# Patient Record
Sex: MALE | Race: WHITE | Employment: FULL TIME | URBAN - METROPOLITAN AREA
[De-identification: names, ages, dates, MRNs, and addresses within clinical notes are randomized per-mention and may not be internally consistent; named-entity substitution may affect disease eponyms.]

---

## 2017-03-23 ENCOUNTER — OFFICE VISIT (OUTPATIENT)
Dept: ONCOLOGY | Age: 57
End: 2017-03-23

## 2017-03-23 VITALS
WEIGHT: 164 LBS | DIASTOLIC BLOOD PRESSURE: 81 MMHG | SYSTOLIC BLOOD PRESSURE: 147 MMHG | RESPIRATION RATE: 18 BRPM | OXYGEN SATURATION: 100 % | TEMPERATURE: 97.8 F | HEIGHT: 76 IN | BODY MASS INDEX: 19.97 KG/M2 | HEART RATE: 77 BPM

## 2017-03-23 DIAGNOSIS — C76.0 HEAD AND NECK CANCER (HCC): Primary | ICD-10-CM

## 2017-03-23 NOTE — PROGRESS NOTES
Renato Polk is a 59 y.o. male here for follow up had concerns including Follow-up. Head and neck, HIPAA verified by two patient identifiers. C/o mild insomnia and mild anxiety ,  Denies pain and sob, patient states feeling well and gaining weight and back to work. Mr. Wesley Stephens has a reminder for a \"due or due soon\" health maintenance. I have asked that he contact his primary care provider for follow-up on this health maintenance.

## 2017-03-23 NOTE — PROGRESS NOTES
Follow-up Note        Patient: Roger Hunter MRN: [id number]  SSN: [id number]    YOB: 1960  Age: 59 y.o. Sex: male      Diagnosis:      1. 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 Concurrent chemo/rads   single agent Cisplatin, Completed 2 cycles, stopped 1/13 due to renal insufficiency   Radiation completed on 2/5/2016  2. Induction chemotherapy - s/p TPF completed 2 cycles      Subjective:      Roger Hunter is a 60 y.o. male with a history of heavy cigarette smoking and alcohol use who was diagnosed with locally advanced oropharyngeal carcinoma. He received 2 cycles of induction chemotherapy and tolerated it well. He had marked improvement in symptoms. He then received concurrent chemo/ Rads. He achieved a complete response. Mr. Manuel Mejia returns for follow-up and feels well. He has returned to work full time. He has gained weight and is eating and swallowing without difficulty. He has delayed geting scans because of insurance issues.       Review of Systems:    Constitutional: negative   Eyes: negative  Ears, Nose, Mouth, Throat, and Face: negative  Respiratory: negative  Cardiovascular: negative  Gastrointestinal: negative  Integument/Breast: negative  Hematologic/Lymphatic: negative  Musculoskeletal:negative  Neurological: negative      Past Medical History:   Diagnosis Date    Anxiety     Cancer ([address]     Sore throat      Past Surgical History:   Procedure Laterality Date    HX OTHER SURGICAL  8-21-15    Biopsy throat    PLACE PERCUT GASTROSTOMY TUBE  9/30/2015           Family History   Problem Relation Age of Onset    Cancer Mother      breast    Cancer Father      prostate    Other Sister      hysterectomy     Social History   Substance Use Topics    Smoking status: Current Every Day Smoker     Packs/day: 0.50     Years: 30.00    Smokeless tobacco: Never Used    Alcohol use No          No Known Allergies        Objective: Vitals:    03/23/17 1334   BP: 147/81   Pulse: 77   Resp: 18   Temp: 97.8 °F (36.6 °C)   SpO2: 100%   Weight: 164 lb (74.4 kg)   Height: 6' 3.5\" (1.918 m)        Physical Exam:    GENERAL: alert, cooperative  EYE: negative  LYMPHATIC: Cervical, supraclavicular, and axillary nodes normal.   THROAT & NECK: posterior pharyngeal wall, no mass, endentuolus  LUNG: clear to auscultation bilaterally  HEART: regular rate and rhythm  ABDOMEN: soft, non-tender  EXTREMITIES: no edema  SKIN: Normal.  NEUROLOGIC: negative      IMAGING:    IMPRESSION:  PET 4/29/2016  1. There has been marked improvement. There has been resolution of the mass    in the oropharynx extending into the hypopharynx. There is slight low grade    residual activity posterior oropharynx may be treatment related. 2. There has been improvement in the bilateral level 2 adenopathy. Lymph    nodes now are normal in size with low-grade activity of 3.4 which is    nonspecific but most likely reactive. Assessment:      1. Squamous cell carcinoma of the oropharynx (epiglottis):    T4b N2a M0 (Stage IVB)    HPV -ve    ECOG PS 0  Intent of therapy - curative    S/P induction chemotherapy with TPF   Completed 2 cycles  Concurrent chemo/rads - single agent Cisplatin, Completed 2 cycles   Cisplatin discontinued due to worsening renal insufficiency  Completed radiation therapy    In remission    Symptom management form reviewed with patient. 2. Renal insufficiency - improving    Observation      3. Pulmonary emobli    > Asymptomatic - incidental finding on CT  > Xarelto started 3/3/16 - completed 6 months    Continue Aspirin 81 mg daily        Plan:        > Port removal  > Will hold off on repeat scans pending insurance resolution  > Follow-up with Dr. Christensen  > Follow-up appointment in 6 months        Signed by: Yves Shea MD                     March 23, 2017          CC.  Rene Dillon MD

## 2017-03-30 ENCOUNTER — HOSPITAL ENCOUNTER (OUTPATIENT)
Dept: INTERVENTIONAL RADIOLOGY/VASCULAR | Age: 57
Discharge: HOME | End: 2017-03-30
Attending: INTERNAL MEDICINE | Admitting: INTERNAL MEDICINE
Payer: COMMERCIAL

## 2017-03-30 VITALS
RESPIRATION RATE: 14 BRPM | HEART RATE: 81 BPM | OXYGEN SATURATION: 100 % | TEMPERATURE: 97.8 F | DIASTOLIC BLOOD PRESSURE: 60 MMHG | SYSTOLIC BLOOD PRESSURE: 108 MMHG | BODY MASS INDEX: 20.51 KG/M2 | HEIGHT: 75 IN | WEIGHT: 165 LBS

## 2017-03-30 DIAGNOSIS — C10.9 OROPHARYNGEAL CANCER (HCC): ICD-10-CM

## 2017-03-30 PROCEDURE — 77030031139 HC SUT VCRL2 J&J -A

## 2017-03-30 PROCEDURE — 77030010507 HC ADH SKN DERMBND J&J -B

## 2017-03-30 PROCEDURE — 36590 REMOVAL TUNNELED CV CATH: CPT

## 2017-03-30 PROCEDURE — 74011000250 HC RX REV CODE- 250: Performed by: RADIOLOGY

## 2017-03-30 PROCEDURE — 74011250636 HC RX REV CODE- 250/636: Performed by: RADIOLOGY

## 2017-03-30 PROCEDURE — 99152 MOD SED SAME PHYS/QHP 5/>YRS: CPT

## 2017-03-30 RX ORDER — LIDOCAINE HYDROCHLORIDE 20 MG/ML
20 INJECTION, SOLUTION INFILTRATION; PERINEURAL ONCE
Status: COMPLETED | OUTPATIENT
Start: 2017-03-30 | End: 2017-03-30

## 2017-03-30 RX ORDER — LIDOCAINE HYDROCHLORIDE AND EPINEPHRINE 10; 10 MG/ML; UG/ML
20 INJECTION, SOLUTION INFILTRATION; PERINEURAL ONCE
Status: COMPLETED | OUTPATIENT
Start: 2017-03-30 | End: 2017-03-30

## 2017-03-30 RX ORDER — SODIUM CHLORIDE 9 MG/ML
25 INJECTION, SOLUTION INTRAVENOUS CONTINUOUS
Status: DISCONTINUED | OUTPATIENT
Start: 2017-03-30 | End: 2017-03-30

## 2017-03-30 RX ORDER — FENTANYL CITRATE 50 UG/ML
100 INJECTION, SOLUTION INTRAMUSCULAR; INTRAVENOUS
Status: DISCONTINUED | OUTPATIENT
Start: 2017-03-30 | End: 2017-03-30

## 2017-03-30 RX ORDER — HEPARIN SODIUM 200 [USP'U]/100ML
400 INJECTION, SOLUTION INTRAVENOUS ONCE
Status: COMPLETED | OUTPATIENT
Start: 2017-03-30 | End: 2017-03-30

## 2017-03-30 RX ORDER — CEFAZOLIN SODIUM IN 0.9 % NACL 2 G/100 ML
2 PLASTIC BAG, INJECTION (ML) INTRAVENOUS ONCE
Status: DISCONTINUED | OUTPATIENT
Start: 2017-03-30 | End: 2017-03-30

## 2017-03-30 RX ORDER — MIDAZOLAM HYDROCHLORIDE 1 MG/ML
5 INJECTION, SOLUTION INTRAMUSCULAR; INTRAVENOUS
Status: DISCONTINUED | OUTPATIENT
Start: 2017-03-30 | End: 2017-03-30

## 2017-03-30 RX ADMIN — LIDOCAINE HYDROCHLORIDE 400 MG: 20 INJECTION, SOLUTION INFILTRATION; PERINEURAL at 09:01

## 2017-03-30 RX ADMIN — FENTANYL CITRATE 50 MCG: 50 INJECTION, SOLUTION INTRAMUSCULAR; INTRAVENOUS at 08:35

## 2017-03-30 RX ADMIN — FENTANYL CITRATE 50 MCG: 50 INJECTION, SOLUTION INTRAMUSCULAR; INTRAVENOUS at 08:45

## 2017-03-30 RX ADMIN — SODIUM CHLORIDE 25 ML/HR: 900 INJECTION, SOLUTION INTRAVENOUS at 08:02

## 2017-03-30 RX ADMIN — LIDOCAINE HYDROCHLORIDE AND EPINEPHRINE 200 MG: 10; 10 INJECTION, SOLUTION INFILTRATION; PERINEURAL at 09:01

## 2017-03-30 RX ADMIN — MIDAZOLAM HYDROCHLORIDE 1 MG: 1 INJECTION INTRAMUSCULAR; INTRAVENOUS at 08:35

## 2017-03-30 RX ADMIN — MIDAZOLAM HYDROCHLORIDE 2 MG: 1 INJECTION INTRAMUSCULAR; INTRAVENOUS at 08:47

## 2017-03-30 RX ADMIN — HEPARIN SODIUM IN SODIUM CHLORIDE 800 UNITS: 200 INJECTION INTRAVENOUS at 09:01

## 2017-03-30 RX ADMIN — MIDAZOLAM HYDROCHLORIDE 1 MG: 1 INJECTION INTRAMUSCULAR; INTRAVENOUS at 08:39

## 2017-03-30 RX ADMIN — SODIUM BICARBONATE 2 ML: 0.2 INJECTION, SOLUTION INTRAVENOUS at 09:01

## 2017-03-30 NOTE — DISCHARGE INSTRUCTIONS
[address]  Special Procedures/Angiography Department    Radiologist:       Date:        Portacath Removal Discharge Instructions      Watch for signs of infection:  redness, fever, chills, increased pain, and/or drainage from the site. If this occurs, call your physician at once. Return next week for an incision check:    Do not sign in. Go to the podium, and ask them to call our department. Please try to come between 9:00AM and 1:00PM    Keep your dressing clean and dry. Leave the dressing in place for the next  three days    Resume your previous diet and follow medication reconciliation form. Do not lift anything heavier than 5 pounds with the affected arm for the next week. You may take Tylenol, as directed on the label, for pain. Avoid ibuprofen (Advil, Motrin) and aspirin as they may cause you to bleed. Because you received sedation, you are not to drive or sign any legal documents for the next 24 hours.       If you have any questions or concerns, please call [phone] and ask for the nurse on-call

## 2017-03-30 NOTE — PROGRESS NOTES
Discharge instructions have been reviewed with patient and present family. Copy given to patient. Pt verbalized understand of instructions and was given  the opportunity to ask questions and receive answers prior to leaving. Pt discharged with family and assisted out by RN in wheelchair.

## 2017-03-30 NOTE — IP AVS SNAPSHOT
Summary of Care Report The Summary of Care report has been created to help improve care coordination. Users with access to [employer] or [employer] [address] (Web-based application) may access additional patient information including the Discharge Summary. If you are not currently a [address] user and need more information, please call the number listed below in the [place] section and ask to be connected with Medical Records. Facility Information Name Address Phone [address] [address] [phone] Patient Information Patient Name Sex  Julio Cesar Julian ([id number]) Male 1960 Discharge Information Admitting Provider Service Area Unit  
 (none) [facility] Andreia Wilson Elizabeth / [phone] Discharge Provider Discharge Date/Time Discharge Disposition Destination (none) (none) (none) (none) Patient Language Language ENGLISH [13] Hospital Problems as of 3/30/2017  Reviewed: 3/23/2017  4:48 PM by Wilmer Humphreys MD  
 None Non-Hospital Problems as of 3/30/2017  Reviewed: 3/23/2017  4:48 PM by Wilmer Humphreys MD  
  
  
  
 Class Noted - Resolved Last Modified Active Problems Oropharyngeal cancer ([facility] [address]  2015 - Present 2015 by Wilmer Humphreys MD  
  Entered by Wilmer Humphreys MD  
  Head and neck cancer [facility])  2015 - Present 10/21/2015 by Amber Petersen NP Entered by Amber Petersen NP Elevated serum creatinine  2016 - Present 2016 by Amber Petersen NP Entered by Amber Petersen NP  
  Pulmonary embolism ([facility] [address]  3/25/2016 - Present 3/25/2016 by Licha Chaney LPN Entered by Licha Chaney LPN You are allergic to the following No active allergies Current Discharge Medication List  
  
ASK your doctor about these medications Dose & Instructions Dispensing Information Comments  
 ondansetron 4 mg disintegrating tablet Commonly known as:  ZOFRAN ODT Dose:  4 mg Take 1 Tab by mouth every eight (8) hours as needed for Nausea. Indications: CANCER CHEMOTHERAPY-INDUCED NAUSEA AND VOMITING Quantity:  60 Tab Refills:  2  
   
 oxyCODONE IR 5 mg immediate release tablet Commonly known as:  Shayan Kuo Dose:  5 mg Take 5 mg by mouth every six (6) hours as needed. Refills:  0  
   
 rivaroxaban 20 mg Tab tablet Commonly known as:  Brooke Mcnamara Dose:  20 mg Take 1 Tab by mouth daily. Quantity:  30 Tab Refills:  2  
   
 varenicline 0.5 mg (11)- 1 mg (42) Dspk Commonly known as:  CHANTIX STARTER HUMPHREY Dose:  1 mg Take 1 mg by mouth daily (after breakfast). Per Dose pack instructions Quantity:  1 Package Refills:  0 Follow-up Information None Discharge Instructions [facility] Special Procedures/Angiography Department Radiologist:    
 
Date:     
 
Portacath Removal Discharge Instructions Watch for signs of infection:  redness, fever, chills, increased pain, and/or drainage from the site. If this occurs, call your physician at once. Return next week for an incision check: Do not sign in. Go to the podium, and ask them to call our department. Please try to come between 9:00AM and 1:00PM 
 
Keep your dressing clean and dry. Leave the dressing in place for the next  three days Resume your previous diet and follow medication reconciliation form. Do not lift anything heavier than 5 pounds with the affected arm for the next week. You may take Tylenol, as directed on the label, for pain. Avoid ibuprofen (Advil, Motrin) and aspirin as they may cause you to bleed. Because you received sedation, you are not to drive or sign any legal documents for the next 24 hours. If you have any questions or concerns, please call [phone] and ask for the nurse on-call Chart Review Routing History Recipient Method Report Sent By Flavia Vargas MD  
Fax: [phone] Phone: [phone] Fax Notes/Transcriptions Carol Vargas MD [[id number]] 9/30/2015  9:00 AM 09/30/2015 Ashley Bowman MD  
Phone: [phone] In Basket Notes/Transcriptions Carol Vargas MD [[id number]] 9/30/2015  9:00 AM 09/30/2015

## 2017-03-30 NOTE — PROGRESS NOTES
Pt arrived from home Ambulatory, stated will call mother Riley Storm to  once procedure complete. Per MD Betsey Florez no antibiotic for procedure.

## 2017-03-30 NOTE — H&P
Radiology History and Physical    Patient: Teri Vega 59 y.o. male       Chief Complaint: No chief complaint on file. History of Present Illness: for port removal    History:    Past Medical History:   Diagnosis Date    Anxiety     Cancer ([address]     Sore throat      Family History   Problem Relation Age of Onset    Cancer Mother      breast    Cancer Father      prostate    Other Sister      hysterectomy     Social History     Social History    Marital status: SINGLE     Spouse name: N/A    Number of children: N/A    Years of education: N/A     Occupational History    Not on file. Social History Main Topics    Smoking status: Current Every Day Smoker     Packs/day: 0.50     Years: 30.00    Smokeless tobacco: Never Used    Alcohol use No    Drug use: No    Sexual activity: Not on file     Other Topics Concern    Not on file     Social History Narrative       Allergies: No Known Allergies    Current Medications:  Current Outpatient Prescriptions   Medication Sig    rivaroxaban (XARELTO) 20 mg tab tablet Take 1 Tab by mouth daily.  ondansetron (ZOFRAN ODT) 4 mg disintegrating tablet Take 1 Tab by mouth every eight (8) hours as needed for Nausea. Indications: CANCER CHEMOTHERAPY-INDUCED NAUSEA AND VOMITING    varenicline (CHANTIX STARTER HUMPHREY) Take 1 mg by mouth daily (after breakfast). Per Dose pack instructions    oxyCODONE IR (ROXICODONE) 5 mg immediate release tablet Take 5 mg by mouth every six (6) hours as needed.      Current Facility-Administered Medications   Medication Dose Route Frequency    heparinized saline 2 units/mL infusion 800 Units  400 mL Irrigation ONCE    lidocaine (XYLOCAINE) 20 mg/mL (2 %) injection 400 mg  20 mL SubCUTAneous ONCE    sodium bicarbonate (NEUT) injection 1 mL  1 mL SubCUTAneous RAD ONCE    lidocaine-EPINEPHrine (XYLOCAINE) 1 %-1:100,000 injection 200 mg  20 mL SubCUTAneous ONCE    0.9% sodium chloride infusion  25 mL/hr IntraVENous CONTINUOUS    fentaNYL citrate (PF) injection 100 mcg  100 mcg IntraVENous Multiple    midazolam (VERSED) injection 5 mg  5 mg IntraVENous Rad Multiple    ceFAZolin (ANCEF) 2g in 100 ml 0.9% NS IVPB  2 g IntraVENous ONCE        Physical Exam:  Blood pressure 128/72, pulse 75, temperature 97.8 °F (36.6 °C), resp. rate 14, height 6' 3\" (1.905 m), weight 74.8 kg (165 lb), SpO2 100 %. LUNG: clear to auscultation bilaterally, HEART: regular rate and rhythm      Alerts:    Hospital Problems  Date Reviewed: 3/23/2017    None          Laboratory:    No results for input(s): HGB, HCT, WBC, PLT, INR, BUN, CREA, K, CRCLT, HGBEXT, HCTEXT, PLTEXT in the last 72 hours. No lab exists for component: PTT, PT, INREXT      Plan of Care/Planned Procedure:  Risks, benefits, and alternatives reviewed with patient and he agrees to proceed with the procedure.        Hanny Mcghee MD

## 2017-09-25 ENCOUNTER — OFFICE VISIT (OUTPATIENT)
Dept: ONCOLOGY | Age: 57
End: 2017-09-25

## 2017-09-25 VITALS
HEART RATE: 75 BPM | BODY MASS INDEX: 23.38 KG/M2 | SYSTOLIC BLOOD PRESSURE: 159 MMHG | WEIGHT: 188 LBS | RESPIRATION RATE: 18 BRPM | TEMPERATURE: 97.7 F | HEIGHT: 75 IN | OXYGEN SATURATION: 100 % | DIASTOLIC BLOOD PRESSURE: 89 MMHG

## 2017-09-25 DIAGNOSIS — C10.9 OROPHARYNGEAL CANCER (HCC): Primary | ICD-10-CM

## 2017-09-25 NOTE — PROGRESS NOTES
Follow-up Note        Patient: Lasha Hyatt MRN: [id number]  SSN: [id number]    YOB: 1960  Age: 59 y.o. Sex: male      Diagnosis:      1. 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 Concurrent chemo/rads   single agent Cisplatin, Completed 2 cycles, stopped 1/13 due to renal insufficiency   Radiation completed on 2/5/2016  2. Induction chemotherapy - s/p TPF completed 2 cycles      Subjective:      Lasha Hyatt is a 60 y.o. male with a history of heavy cigarette smoking and alcohol use who was diagnosed with locally advanced oropharyngeal carcinoma. He received 2 cycles of induction chemotherapy and tolerated it well. He had marked improvement in symptoms. He then received concurrent chemo/ Rads. He achieved a complete response. Mr. Kate Shirley returns for follow-up and feels well. He is working full time. He continues to gain weight and is eating and swallowing without difficulty.        Review of Systems:    Constitutional: negative   Eyes: negative  Ears, Nose, Mouth, Throat, and Face: negative  Respiratory: negative  Cardiovascular: negative  Gastrointestinal: negative  Integument/Breast: negative  Hematologic/Lymphatic: negative  Musculoskeletal:negative  Neurological: negative      Past Medical History:   Diagnosis Date    Anxiety     Cancer [facility] [address]     Sore throat      Past Surgical History:   Procedure Laterality Date    HX OTHER SURGICAL  8-21-15    Biopsy throat    PLACE PERCUT GASTROSTOMY TUBE  9/30/2015           Family History   Problem Relation Age of Onset    Cancer Mother      breast    Cancer Father      prostate    Other Sister      hysterectomy     Social History   Substance Use Topics    Smoking status: Current Every Day Smoker     Packs/day: 0.50     Years: 30.00    Smokeless tobacco: Never Used    Alcohol use No          No Known Allergies        Objective:     Vitals:    09/25/17 0840   BP: 159/89   Pulse: 75   Resp: 18 Temp: 97.7 °F (36.5 °C)   SpO2: 100%   Weight: 188 lb (85.3 kg)   Height: 6' 3\" (1.905 m)        Physical Exam:    GENERAL: alert, cooperative  EYE: negative  LYMPHATIC: Cervical, supraclavicular, and axillary nodes normal.   THROAT & NECK: posterior pharyngeal wall, no mass, endentuolus  LUNG: clear to auscultation bilaterally  HEART: regular rate and rhythm  ABDOMEN: soft, non-tender  EXTREMITIES: no edema  SKIN: Normal.  NEUROLOGIC: negative      IMAGING:    IMPRESSION:  PET 4/29/2016  1. There has been marked improvement. There has been resolution of the mass    in the oropharynx extending into the hypopharynx. There is slight low grade    residual activity posterior oropharynx may be treatment related. 2. There has been improvement in the bilateral level 2 adenopathy. Lymph    nodes now are normal in size with low-grade activity of 3.4 which is    nonspecific but most likely reactive. Assessment:      1. Squamous cell carcinoma of the oropharynx (epiglottis):    T4b N2a M0 (Stage IVB)    HPV -ve    ECOG PS 0  Intent of therapy - curative    S/P induction chemotherapy with TPF   Completed 2 cycles  Concurrent chemo/rads - single agent Cisplatin, Completed 2 cycles   Cisplatin discontinued due to worsening renal insufficiency  Completed radiation therapy    In remission  Asymptomatic  Symptom management form reviewed with patient. 2. H/O Pulmonary emobli    > Asymptomatic - incidental finding on CT  > Xarelto started 3/3/16 - completed 6 months  > Currently on Aspirin 81 mg daily        Plan:        > Continue Aspirin 81 mg daily  > Repeat CT scans in 6 months  > Follow-up appointment in 6 months        Signed by: Zenia Mills MD                     September 25, 2017          CAROLE Markham MD

## 2017-09-25 NOTE — PROGRESS NOTES
Pt is a 59 yr old here for a routine follow up for Oropharyngeal cancer, finished chemo  and radiation. Pt has gained weigh and is happy about this. Pt denies pain, eating well, no N/V/D.

## 2018-02-09 ENCOUNTER — HOSPITAL ENCOUNTER (OUTPATIENT)
Dept: CT IMAGING | Age: 58
Discharge: HOME | End: 2018-02-09
Attending: INTERNAL MEDICINE
Payer: COMMERCIAL

## 2018-02-09 ENCOUNTER — APPOINTMENT (OUTPATIENT)
Dept: CT IMAGING | Age: 58
End: 2018-02-09
Attending: INTERNAL MEDICINE
Payer: COMMERCIAL

## 2018-02-09 DIAGNOSIS — C10.9 OROPHARYNGEAL CANCER (HCC): ICD-10-CM

## 2018-02-09 PROCEDURE — 70491 CT SOFT TISSUE NECK W/DYE: CPT

## 2018-02-09 PROCEDURE — 74011636320 HC RX REV CODE- 636/320: Performed by: INTERNAL MEDICINE

## 2018-02-09 PROCEDURE — 71260 CT THORAX DX C+: CPT

## 2018-02-09 RX ORDER — SODIUM CHLORIDE 9 MG/ML
50 INJECTION, SOLUTION INTRAVENOUS
Status: DISPENSED | OUTPATIENT
Start: 2018-02-09 | End: 2018-02-09

## 2018-02-09 RX ORDER — SODIUM CHLORIDE 0.9 % (FLUSH) 0.9 %
10 SYRINGE (ML) INJECTION
Status: ACTIVE | OUTPATIENT
Start: 2018-02-09 | End: 2018-02-09

## 2018-02-09 RX ADMIN — IOPAMIDOL 100 ML: 755 INJECTION, SOLUTION INTRAVENOUS at 09:00

## 2018-02-16 ENCOUNTER — OFFICE VISIT (OUTPATIENT)
Dept: ONCOLOGY | Age: 58
End: 2018-02-16

## 2018-02-16 VITALS
HEIGHT: 75 IN | WEIGHT: 199.4 LBS | DIASTOLIC BLOOD PRESSURE: 83 MMHG | OXYGEN SATURATION: 98 % | BODY MASS INDEX: 24.79 KG/M2 | SYSTOLIC BLOOD PRESSURE: 149 MMHG | RESPIRATION RATE: 16 BRPM | HEART RATE: 79 BPM | TEMPERATURE: 98.3 F

## 2018-02-16 DIAGNOSIS — I26.99 OTHER ACUTE PULMONARY EMBOLISM WITHOUT ACUTE COR PULMONALE (HCC): ICD-10-CM

## 2018-02-16 DIAGNOSIS — C76.0 HEAD AND NECK CANCER (HCC): Primary | ICD-10-CM

## 2018-02-16 DIAGNOSIS — C10.9 OROPHARYNGEAL CANCER (HCC): ICD-10-CM

## 2018-02-16 NOTE — PROGRESS NOTES
Follow-up Note        Patient: Nisas Benavides MRN: [id number]  SSN: [id number]    YOB: 1960  Age: 59 y.o. Sex: male      Diagnosis:      1. 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 Concurrent chemo/rads   single agent Cisplatin, Completed 2 cycles, stopped 1/13 due to renal insufficiency   Radiation completed on 2/5/2016  2. Induction chemotherapy - s/p TPF completed 2 cycles      Subjective:      Nissa Benavides is a 60 y.o. male with a history of heavy cigarette smoking and alcohol use who was diagnosed with locally advanced oropharyngeal carcinoma. He received 2 cycles of induction chemotherapy and tolerated it well. He had marked improvement in symptoms. He then received concurrent chemo/ Rads. He achieved a complete response. Mr. Joel Wilkerson returns for follow-up and feels well. He is working full time. He is doing well and does not verbalize any new complaints. Review of Systems:    Constitutional: negative   Eyes: negative  Ears, Nose, Mouth, Throat, and Face: negative  Respiratory: negative  Cardiovascular: negative  Gastrointestinal: negative  Integument/Breast: negative  Hematologic/Lymphatic: negative  Musculoskeletal:negative  Neurological: negative      Past Medical History:   Diagnosis Date    Anxiety     Cancer ([facility] [address]     Sore throat      Past Surgical History:   Procedure Laterality Date    HX OTHER SURGICAL  8-21-15    Biopsy throat    PLACE PERCUT GASTROSTOMY TUBE  9/30/2015           Family History   Problem Relation Age of Onset    Cancer Mother      breast    Cancer Father      prostate    Other Sister      hysterectomy     Social History   Substance Use Topics    Smoking status: Current Every Day Smoker     Packs/day: 0.50     Years: 30.00    Smokeless tobacco: Never Used    Alcohol use No          No Known Allergies        Objective: There were no vitals filed for this visit.      Physical Exam:    GENERAL: alert, cooperative  EYE: negative  LYMPHATIC: Cervical, supraclavicular, and axillary nodes normal.   THROAT & NECK: posterior pharyngeal wall, no mass, endentuolus  LUNG: clear to auscultation bilaterally  HEART: regular rate and rhythm  ABDOMEN: soft, non-tender  EXTREMITIES: no edema  SKIN: Normal.  NEUROLOGIC: negative      CT Results (most recent):    Results from Hospital Encounter encounter on 02/09/18   CT CHEST W CONT   Narrative INDICATION: Restaging disease  , squamous cell carcinoma of the oropharynx  status post chemotherapy and radiation therapy, therapy completed. COMPARISON: CT of 3/3/2016, PET CT of 4/29/2016    TECHNIQUE:  Following the uneventful intravenous administration of 100 cc  Isovue-370, 5 mm axial images were obtained through the chest. Coronal and  sagittal reconstructions were generated. CT dose reduction was achieved through  use of a standardized protocol tailored for this examination and automatic  exposure control for dose modulation. The study was performed in conjunction  with CT of the neck, which is reported separately. FINDINGS:    MEDIASTINUM: No mass or lymphadenopathy. NIKO: No mass or lymphadenopathy. THORACIC AORTA: No dissection or aneurysm. MAIN PULMONARY ARTERY: Normal in caliber. TRACHEA/BRONCHI: Patent. ESOPHAGUS: No wall thickening or dilatation. HEART: Normal in size. PLEURA: No effusion or pneumothorax. LUNGS: No nodule, mass, or airspace disease. Centrilobular emphysema,  particularly in the upper lobes. INCIDENTALLY IMAGED UPPER ABDOMEN: Unchanged small right adrenal adenoma. Cholelithiasis. Small hiatus hernia. BONES: No destructive bone lesion. Impression IMPRESSION:  1. No acute findings or evidence of metastatic disease in the chest.  2. Mild centrilobular emphysema. 3. Cholelithiasis. 4. Unchanged small right adrenal adenoma. CT personally reviewed. Assessment:      1.  Squamous cell carcinoma of the oropharynx (epiglottis): T4b N2a M0 (Stage IVB)    HPV -ve    ECOG PS 0  Intent of therapy - curative    S/P induction chemotherapy with TPF   Completed 2 cycles  Concurrent chemo/rads - single agent Cisplatin, Completed 2 cycles   Cisplatin discontinued due to worsening renal insufficiency  Completed radiation therapy    In remission  Asymptomatic  Symptom management form reviewed with patient. 2. H/O Pulmonary emobli    > Asymptomatic - incidental finding on CT  > Xarelto started 3/3/16 - completed 6 months  > Currently on Aspirin 81 mg daily        Plan:        > Continue Aspirin 81 mg daily  > Repeat CT scans in 6 months  > Follow-up appointment in 6 months        Signed by: Alessandra Fabian MD                     February 16, 2018          CC.  Dorys Jay MD

## 2018-02-16 NOTE — PROGRESS NOTES
Jf Evangelista is a 59 y.o. male here today for advanced oropharyngeal cancer; in remission f/u. Completed Rads 2/2016. VS stable. Patient denies pain. Patient denies N/V/D and constipation. Patient denies falls. Patient denies numbness and tingling. Good appetite. Blood pressure 149/83, pulse 79, temperature 98.3 °F (36.8 °C), resp. rate 16, height 6' 3\" (1.905 m), weight 199 lb 6.4 oz (90.4 kg), SpO2 98 %.

## 2018-08-15 ENCOUNTER — DOCUMENTATION ONLY (OUTPATIENT)
Dept: ONCOLOGY | Age: 58
End: 2018-08-15

## 2018-08-15 NOTE — PROGRESS NOTES
Pt called to cancel 8/16/18 appt. Pt was laid off from job in May, and currently has no insurance. Made patient aware of Sliding Fee scale, and we would see him at any time. Patient is feeling well, and does not wish to be seen at this point, but will be calling once he finds employment with benefits.

## 2020-11-17 NOTE — PROGRESS NOTES
Yvon Maravilla is a 56 y.o. male here for Oropharyngeal cancer. Has not been seen since Feb, 2018. He feels like the tumor may have come back in throat. He can feel the tumor with his hands. Felt it a few months ago and has been getting bigger. He does have pain in both sides of neck and is intermittent. Level 5/6 when he has it. He uses ibuprofen for it.

## 2020-11-18 ENCOUNTER — DOCUMENTATION ONLY (OUTPATIENT)
Dept: ONCOLOGY | Age: 60
End: 2020-11-18

## 2020-11-18 ENCOUNTER — OFFICE VISIT (OUTPATIENT)
Dept: ONCOLOGY | Age: 60
End: 2020-11-18
Payer: COMMERCIAL

## 2020-11-18 VITALS
TEMPERATURE: 97.8 F | DIASTOLIC BLOOD PRESSURE: 80 MMHG | BODY MASS INDEX: 19.7 KG/M2 | HEART RATE: 92 BPM | HEIGHT: 75 IN | WEIGHT: 158.4 LBS | SYSTOLIC BLOOD PRESSURE: 158 MMHG

## 2020-11-18 DIAGNOSIS — C76.0 HEAD AND NECK CANCER (HCC): Primary | ICD-10-CM

## 2020-11-18 DIAGNOSIS — R13.10 DYSPHAGIA, UNSPECIFIED TYPE: ICD-10-CM

## 2020-11-18 DIAGNOSIS — E43 SEVERE PROTEIN-CALORIE MALNUTRITION (HCC): ICD-10-CM

## 2020-11-18 PROCEDURE — 99215 OFFICE O/P EST HI 40 MIN: CPT | Performed by: INTERNAL MEDICINE

## 2020-11-18 NOTE — PROGRESS NOTES
[address] [facility]  at [address], [address] [facility]  [phone]       Follow-up Note        Patient: Marlena Grace MRN: [id number]  SSN: [id number]    YOB: 1960  Age: 56 y.o. Sex: male      Diagnosis:      1. Squamous cell carcinoma of the oropharynx, Dx 2015   T4b N2a M0 (Stage IVB)    Treatment:      Previous therapies    1. Concurrent chemo/rads   single agent Cisplatin, Completed 2 cycles, stopped 1/13 due to renal insufficiency   Radiation completed on 2/5/2016  2. Induction chemotherapy - s/p TPF completed 2 cycles    Subjective:      Marlena Grace is a 60 y.o. male with a history of heavy cigarette smoking and alcohol use who was diagnosed with locally advanced oropharyngeal carcinoma in 2015. He received 2 cycles of induction chemotherapy and tolerated it well. He had marked improvement in symptoms. He then received concurrent chemo/ Rads. He achieved a complete response. Mr. Mariana Brooke returns for follow-up. He has been experiencing progressive hoarseness of voice, difficulty in swallowing and weight loss. He is working full time. He is weaker.        Review of Systems:    Constitutional: fatigue, weakness  Eyes: negative  Ears, Nose, Mouth, Throat, and Face: dysphagia, hoarseness of voice  Respiratory: negative  Cardiovascular: negative  Gastrointestinal: dysphagia  Integument/Breast: negative  Hematologic/Lymphatic: negative  Musculoskeletal:negative  Neurological: negative      Past Medical History:   Diagnosis Date    Anxiety     Cancer ([address]     Sore throat      Past Surgical History:   Procedure Laterality Date    HX OTHER SURGICAL  8-21-15    Biopsy throat    PLACE PERCUT GASTROSTOMY TUBE  9/30/2015           Family History   Problem Relation Age of Onset    Cancer Mother         breast    Cancer Father         prostate    Other Sister         hysterectomy     Social History Tobacco Use    Smoking status: Current Every Day Smoker     Packs/day: 0.50     Years: 30.00     Pack years: 15.00    Smokeless tobacco: Never Used   Substance Use Topics    Alcohol use: No     Alcohol/week: 0.0 standard drinks          No Known Allergies        Objective:     Vitals:    11/18/20 0923   BP: (!) 158/80   Pulse: 92   Temp: 97.8 °F (36.6 °C)   Weight: 158 lb 6.4 oz (71.8 kg)   Height: 6' 3\" (1.905 m)        Physical Exam:    GENERAL: alert, cooperative, weak, cachectic  EYE: negative, no pallor or icterus  LYMPHATIC: Cervical, supraclavicular, and axillary nodes normal.   THROAT & NECK: hard appearing larynx  LUNG: clear to auscultation bilaterally  HEART: regular rate and rhythm  ABDOMEN: soft, non-tender on palpation  EXTREMITIES: no edema  SKIN: Normal. No rash or ecchymosis  NEUROLOGIC: negative          Assessment:      1. 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 H/O Pulmonary emobli    > Asymptomatic - incidental finding on CT  > Xarelto started 3/3/16 - completed 6 months  > Currently on Aspirin 81 mg daily      3. Severe protein calorie malnutrition    > Dietician consult  > Protein supplementation      Plan:        > CT neck and chest  > ENT eval  > Dietician consult  > Protein supplementation      Signed by: Marty Ceja MD                     November 18, 2020      CCChanel Mcrae MD

## 2020-11-18 NOTE — PROGRESS NOTES
[employer]  Social Work Navigator Encounter     Patient Name:  Mary Muse    Medical History: dx head and neck cancer    Advance Directives:    Narrative: Pt been feeling the discomfort in his throat since February etc.   Covid and denial he says and kept thinking I will make the appt soon. Pt working evening 7-3:30 for [employer]. But not a lot of staff so people have vacation/sick and he has to work longer because there are only 3 of them. Pt will need to see Dr. Lynne.  Pt has lost 28 lbs. Pt drinking ensure to try and maintain weight. Pt will needs scans.        Barriers to Care:     Plan:    Referral:     Other referral

## 2020-11-28 ENCOUNTER — HOSPITAL ENCOUNTER (OUTPATIENT)
Dept: CT IMAGING | Age: 60
Discharge: HOME | End: 2020-11-28
Attending: INTERNAL MEDICINE
Payer: COMMERCIAL

## 2020-11-28 DIAGNOSIS — C76.0 HEAD AND NECK CANCER (HCC): ICD-10-CM

## 2020-11-28 DIAGNOSIS — R13.10 DYSPHAGIA, UNSPECIFIED TYPE: ICD-10-CM

## 2020-11-28 PROCEDURE — 71260 CT THORAX DX C+: CPT

## 2020-11-28 PROCEDURE — 70491 CT SOFT TISSUE NECK W/DYE: CPT

## 2020-11-28 PROCEDURE — 74011000636 HC RX REV CODE- 636: Performed by: INTERNAL MEDICINE

## 2020-11-28 RX ORDER — SODIUM CHLORIDE 0.9 % (FLUSH) 0.9 %
10 SYRINGE (ML) INJECTION
Status: COMPLETED | OUTPATIENT
Start: 2020-11-28 | End: 2020-11-28

## 2020-11-28 RX ADMIN — IOPAMIDOL 100 ML: 755 INJECTION, SOLUTION INTRAVENOUS at 09:13

## 2020-11-28 RX ADMIN — Medication 10 ML: at 09:13

## 2020-11-30 ENCOUNTER — TELEPHONE (OUTPATIENT)
Dept: ONCOLOGY | Age: 60
End: 2020-11-30

## 2020-11-30 NOTE — TELEPHONE ENCOUNTER
----- Message from Deann Thomas MD sent at 11/29/2020  9:49 PM EST -----  NO clear evidence of disease progression/recurrence.

## 2020-11-30 NOTE — TELEPHONE ENCOUNTER
Pt met with  last week and had a scope done and was told he does have a mass noted. Pt asked that  and  please speak to discuss what is needed next.

## 2020-12-08 ENCOUNTER — HOSPITAL ENCOUNTER (OUTPATIENT)
Dept: LAB | Age: 60
Discharge: HOME | End: 2020-12-08

## 2020-12-21 ENCOUNTER — TRANSCRIBE ORDER (OUTPATIENT)
Dept: REGISTRATION | Age: 60
End: 2020-12-21

## 2020-12-21 DIAGNOSIS — Z01.812 PRE-PROCEDURE LAB EXAM: Primary | ICD-10-CM

## 2020-12-22 ENCOUNTER — HOSPITAL ENCOUNTER (OUTPATIENT)
Dept: PREADMISSION TESTING | Age: 60
Discharge: HOME | End: 2020-12-22
Payer: COMMERCIAL

## 2020-12-22 VITALS
DIASTOLIC BLOOD PRESSURE: 82 MMHG | SYSTOLIC BLOOD PRESSURE: 151 MMHG | HEART RATE: 79 BPM | BODY MASS INDEX: 19.41 KG/M2 | HEIGHT: 75 IN | OXYGEN SATURATION: 98 % | WEIGHT: 156.09 LBS | TEMPERATURE: 98.4 F

## 2020-12-22 LAB
BASOPHILS # BLD: 0 K/UL (ref 0–0.1)
BASOPHILS NFR BLD: 0 % (ref 0–1)
DIFFERENTIAL METHOD BLD: ABNORMAL
EOSINOPHIL # BLD: 0.2 K/UL (ref 0–0.4)
EOSINOPHIL NFR BLD: 2 % (ref 0–7)
ERYTHROCYTE [DISTWIDTH] IN BLOOD BY AUTOMATED COUNT: 14.6 % (ref 11.5–14.5)
HCT VFR BLD AUTO: 34.4 % (ref 36.6–50.3)
HGB BLD-MCNC: 11 G/DL (ref 12.1–17)
IMM GRANULOCYTES # BLD AUTO: 0 K/UL (ref 0–0.04)
IMM GRANULOCYTES NFR BLD AUTO: 0 % (ref 0–0.5)
LYMPHOCYTES # BLD: 0.5 K/UL (ref 0.8–3.5)
LYMPHOCYTES NFR BLD: 6 % (ref 12–49)
MCH RBC QN AUTO: 27.6 PG (ref 26–34)
MCHC RBC AUTO-ENTMCNC: 32 G/DL (ref 30–36.5)
MCV RBC AUTO: 86.4 FL (ref 80–99)
MONOCYTES # BLD: 0.7 K/UL (ref 0–1)
MONOCYTES NFR BLD: 9 % (ref 5–13)
NEUTS SEG # BLD: 6.1 K/UL (ref 1.8–8)
NEUTS SEG NFR BLD: 83 % (ref 32–75)
NRBC # BLD: 0 K/UL (ref 0–0.01)
NRBC BLD-RTO: 0 PER 100 WBC
PLATELET # BLD AUTO: 239 K/UL (ref 150–400)
PMV BLD AUTO: 10.7 FL (ref 8.9–12.9)
RBC # BLD AUTO: 3.98 M/UL (ref 4.1–5.7)
RBC MORPH BLD: ABNORMAL
WBC # BLD AUTO: 7.5 K/UL (ref 4.1–11.1)

## 2020-12-22 PROCEDURE — 85025 COMPLETE CBC W/AUTO DIFF WBC: CPT

## 2020-12-22 PROCEDURE — 36415 COLL VENOUS BLD VENIPUNCTURE: CPT

## 2020-12-24 ENCOUNTER — HOSPITAL ENCOUNTER (OUTPATIENT)
Dept: PREADMISSION TESTING | Age: 60
Discharge: HOME | End: 2020-12-24
Payer: COMMERCIAL

## 2020-12-24 DIAGNOSIS — Z01.812 PRE-PROCEDURE LAB EXAM: ICD-10-CM

## 2020-12-24 PROCEDURE — 87635 SARS-COV-2 COVID-19 AMP PRB: CPT

## 2020-12-25 ENCOUNTER — ANESTHESIA EVENT (OUTPATIENT)
Dept: SURGERY | Age: 60
End: 2020-12-25
Payer: COMMERCIAL

## 2020-12-25 LAB — SARS-COV-2, COV2NT: NOT DETECTED

## 2020-12-28 ENCOUNTER — ANESTHESIA (OUTPATIENT)
Dept: SURGERY | Age: 60
End: 2020-12-28
Payer: COMMERCIAL

## 2020-12-28 ENCOUNTER — HOSPITAL ENCOUNTER (OUTPATIENT)
Age: 60
Setting detail: OUTPATIENT SURGERY
Discharge: HOME | End: 2020-12-28
Attending: OTOLARYNGOLOGY | Admitting: OTOLARYNGOLOGY
Payer: COMMERCIAL

## 2020-12-28 VITALS
OXYGEN SATURATION: 94 % | SYSTOLIC BLOOD PRESSURE: 126 MMHG | DIASTOLIC BLOOD PRESSURE: 70 MMHG | TEMPERATURE: 97.4 F | WEIGHT: 156.09 LBS | RESPIRATION RATE: 24 BRPM | HEIGHT: 75 IN | HEART RATE: 74 BPM | BODY MASS INDEX: 19.41 KG/M2

## 2020-12-28 PROCEDURE — 74011000250 HC RX REV CODE- 250: Performed by: NURSE ANESTHETIST, CERTIFIED REGISTERED

## 2020-12-28 PROCEDURE — 76010000149 HC OR TIME 1 TO 1.5 HR: Performed by: OTOLARYNGOLOGY

## 2020-12-28 PROCEDURE — 74011250637 HC RX REV CODE- 250/637: Performed by: ANESTHESIOLOGY

## 2020-12-28 PROCEDURE — 74011250636 HC RX REV CODE- 250/636

## 2020-12-28 PROCEDURE — 76210000006 HC OR PH I REC 0.5 TO 1 HR: Performed by: OTOLARYNGOLOGY

## 2020-12-28 PROCEDURE — 76060000034 HC ANESTHESIA 1.5 TO 2 HR: Performed by: OTOLARYNGOLOGY

## 2020-12-28 PROCEDURE — 88312 SPECIAL STAINS GROUP 1: CPT

## 2020-12-28 PROCEDURE — 77030008684 HC TU ET CUF COVD -B: Performed by: ANESTHESIOLOGY

## 2020-12-28 PROCEDURE — 77030040361 HC SLV COMPR DVT MDII -B: Performed by: OTOLARYNGOLOGY

## 2020-12-28 PROCEDURE — 74011250636 HC RX REV CODE- 250/636: Performed by: OTOLARYNGOLOGY

## 2020-12-28 PROCEDURE — 76210000021 HC REC RM PH II 0.5 TO 1 HR: Performed by: OTOLARYNGOLOGY

## 2020-12-28 PROCEDURE — 88331 PATH CONSLTJ SURG 1 BLK 1SPC: CPT

## 2020-12-28 PROCEDURE — 74011250636 HC RX REV CODE- 250/636: Performed by: ANESTHESIOLOGY

## 2020-12-28 PROCEDURE — 77030018836 HC SOL IRR NACL ICUM -A: Performed by: OTOLARYNGOLOGY

## 2020-12-28 PROCEDURE — 88305 TISSUE EXAM BY PATHOLOGIST: CPT

## 2020-12-28 PROCEDURE — 74011250636 HC RX REV CODE- 250/636: Performed by: NURSE ANESTHETIST, CERTIFIED REGISTERED

## 2020-12-28 PROCEDURE — 2709999900 HC NON-CHARGEABLE SUPPLY: Performed by: OTOLARYNGOLOGY

## 2020-12-28 PROCEDURE — 77030040922 HC BLNKT HYPOTHRM STRY -A

## 2020-12-28 RX ORDER — MIDAZOLAM HYDROCHLORIDE 1 MG/ML
0.5 INJECTION, SOLUTION INTRAMUSCULAR; INTRAVENOUS
Status: DISCONTINUED | OUTPATIENT
Start: 2020-12-28 | End: 2020-12-28 | Stop reason: HOSPADM

## 2020-12-28 RX ORDER — FENTANYL CITRATE 50 UG/ML
INJECTION, SOLUTION INTRAMUSCULAR; INTRAVENOUS AS NEEDED
Status: DISCONTINUED | OUTPATIENT
Start: 2020-12-28 | End: 2020-12-28 | Stop reason: HOSPADM

## 2020-12-28 RX ORDER — PHENYLEPHRINE HCL IN 0.9% NACL 0.4MG/10ML
SYRINGE (ML) INTRAVENOUS AS NEEDED
Status: DISCONTINUED | OUTPATIENT
Start: 2020-12-28 | End: 2020-12-28 | Stop reason: HOSPADM

## 2020-12-28 RX ORDER — GLYCOPYRROLATE 0.2 MG/ML
0.2 INJECTION INTRAMUSCULAR; INTRAVENOUS
Status: DISCONTINUED | OUTPATIENT
Start: 2020-12-28 | End: 2020-12-28 | Stop reason: HOSPADM

## 2020-12-28 RX ORDER — GLYCOPYRROLATE 0.2 MG/ML
INJECTION INTRAMUSCULAR; INTRAVENOUS AS NEEDED
Status: DISCONTINUED | OUTPATIENT
Start: 2020-12-28 | End: 2020-12-28 | Stop reason: HOSPADM

## 2020-12-28 RX ORDER — SODIUM CHLORIDE 9 MG/ML
25 INJECTION, SOLUTION INTRAVENOUS CONTINUOUS
Status: DISCONTINUED | OUTPATIENT
Start: 2020-12-28 | End: 2020-12-28 | Stop reason: HOSPADM

## 2020-12-28 RX ORDER — EPINEPHRINE 1 MG/ML
INJECTION, SOLUTION, CONCENTRATE INTRAVENOUS AS NEEDED
Status: DISCONTINUED | OUTPATIENT
Start: 2020-12-28 | End: 2020-12-28 | Stop reason: HOSPADM

## 2020-12-28 RX ORDER — ACETAMINOPHEN 325 MG/1
650 TABLET ORAL ONCE
Status: COMPLETED | OUTPATIENT
Start: 2020-12-28 | End: 2020-12-28

## 2020-12-28 RX ORDER — HYDROMORPHONE HYDROCHLORIDE 1 MG/ML
0.2 INJECTION, SOLUTION INTRAMUSCULAR; INTRAVENOUS; SUBCUTANEOUS
Status: DISCONTINUED | OUTPATIENT
Start: 2020-12-28 | End: 2020-12-28 | Stop reason: HOSPADM

## 2020-12-28 RX ORDER — SODIUM CHLORIDE, SODIUM LACTATE, POTASSIUM CHLORIDE, CALCIUM CHLORIDE 600; 310; 30; 20 MG/100ML; MG/100ML; MG/100ML; MG/100ML
1000 INJECTION, SOLUTION INTRAVENOUS CONTINUOUS
Status: DISCONTINUED | OUTPATIENT
Start: 2020-12-28 | End: 2020-12-28 | Stop reason: HOSPADM

## 2020-12-28 RX ORDER — ONDANSETRON 2 MG/ML
INJECTION INTRAMUSCULAR; INTRAVENOUS
Status: COMPLETED
Start: 2020-12-28 | End: 2020-12-28

## 2020-12-28 RX ORDER — FENTANYL CITRATE 50 UG/ML
25 INJECTION, SOLUTION INTRAMUSCULAR; INTRAVENOUS
Status: COMPLETED | OUTPATIENT
Start: 2020-12-28 | End: 2020-12-28

## 2020-12-28 RX ORDER — ONDANSETRON 2 MG/ML
INJECTION INTRAMUSCULAR; INTRAVENOUS AS NEEDED
Status: DISCONTINUED | OUTPATIENT
Start: 2020-12-28 | End: 2020-12-28 | Stop reason: HOSPADM

## 2020-12-28 RX ORDER — MIDAZOLAM HYDROCHLORIDE 1 MG/ML
1 INJECTION, SOLUTION INTRAMUSCULAR; INTRAVENOUS AS NEEDED
Status: DISCONTINUED | OUTPATIENT
Start: 2020-12-28 | End: 2020-12-28 | Stop reason: HOSPADM

## 2020-12-28 RX ORDER — ROPIVACAINE HYDROCHLORIDE 5 MG/ML
30 INJECTION, SOLUTION EPIDURAL; INFILTRATION; PERINEURAL AS NEEDED
Status: DISCONTINUED | OUTPATIENT
Start: 2020-12-28 | End: 2020-12-28 | Stop reason: HOSPADM

## 2020-12-28 RX ORDER — PROPOFOL 10 MG/ML
INJECTION, EMULSION INTRAVENOUS AS NEEDED
Status: DISCONTINUED | OUTPATIENT
Start: 2020-12-28 | End: 2020-12-28 | Stop reason: HOSPADM

## 2020-12-28 RX ORDER — DIPHENHYDRAMINE HYDROCHLORIDE 50 MG/ML
12.5 INJECTION, SOLUTION INTRAMUSCULAR; INTRAVENOUS AS NEEDED
Status: DISCONTINUED | OUTPATIENT
Start: 2020-12-28 | End: 2020-12-28 | Stop reason: HOSPADM

## 2020-12-28 RX ORDER — ALBUTEROL SULFATE 0.83 MG/ML
2.5 SOLUTION RESPIRATORY (INHALATION) AS NEEDED
Status: DISCONTINUED | OUTPATIENT
Start: 2020-12-28 | End: 2020-12-28 | Stop reason: HOSPADM

## 2020-12-28 RX ORDER — MIDAZOLAM HYDROCHLORIDE 1 MG/ML
INJECTION, SOLUTION INTRAMUSCULAR; INTRAVENOUS AS NEEDED
Status: DISCONTINUED | OUTPATIENT
Start: 2020-12-28 | End: 2020-12-28 | Stop reason: HOSPADM

## 2020-12-28 RX ORDER — FENTANYL CITRATE 50 UG/ML
INJECTION, SOLUTION INTRAMUSCULAR; INTRAVENOUS
Status: COMPLETED
Start: 2020-12-28 | End: 2020-12-28

## 2020-12-28 RX ORDER — SUCCINYLCHOLINE CHLORIDE 20 MG/ML
INJECTION INTRAMUSCULAR; INTRAVENOUS AS NEEDED
Status: DISCONTINUED | OUTPATIENT
Start: 2020-12-28 | End: 2020-12-28 | Stop reason: HOSPADM

## 2020-12-28 RX ORDER — HYDROCODONE BITARTRATE AND ACETAMINOPHEN 5; 325 MG/1; MG/1
1 TABLET ORAL AS NEEDED
Status: DISCONTINUED | OUTPATIENT
Start: 2020-12-28 | End: 2020-12-28 | Stop reason: HOSPADM

## 2020-12-28 RX ORDER — FENTANYL CITRATE 50 UG/ML
50 INJECTION, SOLUTION INTRAMUSCULAR; INTRAVENOUS AS NEEDED
Status: DISCONTINUED | OUTPATIENT
Start: 2020-12-28 | End: 2020-12-28 | Stop reason: HOSPADM

## 2020-12-28 RX ORDER — MORPHINE SULFATE 10 MG/ML
2 INJECTION, SOLUTION INTRAMUSCULAR; INTRAVENOUS
Status: DISCONTINUED | OUTPATIENT
Start: 2020-12-28 | End: 2020-12-28 | Stop reason: HOSPADM

## 2020-12-28 RX ORDER — LIDOCAINE HYDROCHLORIDE 10 MG/ML
0.1 INJECTION, SOLUTION EPIDURAL; INFILTRATION; INTRACAUDAL; PERINEURAL AS NEEDED
Status: DISCONTINUED | OUTPATIENT
Start: 2020-12-28 | End: 2020-12-28 | Stop reason: HOSPADM

## 2020-12-28 RX ORDER — ACETAMINOPHEN 500 MG
1000 TABLET ORAL
COMMUNITY

## 2020-12-28 RX ORDER — ONDANSETRON 2 MG/ML
4 INJECTION INTRAMUSCULAR; INTRAVENOUS AS NEEDED
Status: DISCONTINUED | OUTPATIENT
Start: 2020-12-28 | End: 2020-12-28 | Stop reason: HOSPADM

## 2020-12-28 RX ORDER — DEXMEDETOMIDINE HYDROCHLORIDE 100 UG/ML
INJECTION, SOLUTION INTRAVENOUS AS NEEDED
Status: DISCONTINUED | OUTPATIENT
Start: 2020-12-28 | End: 2020-12-28 | Stop reason: HOSPADM

## 2020-12-28 RX ORDER — LIDOCAINE HYDROCHLORIDE 20 MG/ML
INJECTION, SOLUTION EPIDURAL; INFILTRATION; INTRACAUDAL; PERINEURAL AS NEEDED
Status: DISCONTINUED | OUTPATIENT
Start: 2020-12-28 | End: 2020-12-28 | Stop reason: HOSPADM

## 2020-12-28 RX ORDER — DEXAMETHASONE SODIUM PHOSPHATE 4 MG/ML
INJECTION, SOLUTION INTRA-ARTICULAR; INTRALESIONAL; INTRAMUSCULAR; INTRAVENOUS; SOFT TISSUE AS NEEDED
Status: DISCONTINUED | OUTPATIENT
Start: 2020-12-28 | End: 2020-12-28 | Stop reason: HOSPADM

## 2020-12-28 RX ADMIN — FENTANYL CITRATE 25 MCG: 50 INJECTION, SOLUTION INTRAMUSCULAR; INTRAVENOUS at 09:10

## 2020-12-28 RX ADMIN — PROPOFOL 50 MG: 10 INJECTION, EMULSION INTRAVENOUS at 08:21

## 2020-12-28 RX ADMIN — MORPHINE SULFATE 2 MG: 10 INJECTION INTRAVENOUS at 09:30

## 2020-12-28 RX ADMIN — ONDANSETRON 4 MG: 2 INJECTION INTRAMUSCULAR; INTRAVENOUS at 09:10

## 2020-12-28 RX ADMIN — DEXAMETHASONE SODIUM PHOSPHATE 10 MG: 4 INJECTION, SOLUTION INTRAMUSCULAR; INTRAVENOUS at 07:38

## 2020-12-28 RX ADMIN — GLYCOPYRROLATE 0.2 MG: 0.2 INJECTION, SOLUTION INTRAMUSCULAR; INTRAVENOUS at 07:25

## 2020-12-28 RX ADMIN — FENTANYL CITRATE 25 MCG: 50 INJECTION, SOLUTION INTRAMUSCULAR; INTRAVENOUS at 09:15

## 2020-12-28 RX ADMIN — MORPHINE SULFATE 2 MG: 10 INJECTION INTRAVENOUS at 09:20

## 2020-12-28 RX ADMIN — ONDANSETRON HYDROCHLORIDE 4 MG: 2 INJECTION, SOLUTION INTRAMUSCULAR; INTRAVENOUS at 07:38

## 2020-12-28 RX ADMIN — FENTANYL CITRATE 50 MCG: 50 INJECTION, SOLUTION INTRAMUSCULAR; INTRAVENOUS at 07:30

## 2020-12-28 RX ADMIN — LIDOCAINE HYDROCHLORIDE 80 MG: 20 INJECTION, SOLUTION EPIDURAL; INFILTRATION; INTRACAUDAL; PERINEURAL at 07:33

## 2020-12-28 RX ADMIN — FENTANYL CITRATE 25 MCG: 50 INJECTION, SOLUTION INTRAMUSCULAR; INTRAVENOUS at 09:05

## 2020-12-28 RX ADMIN — ACETAMINOPHEN 650 MG: 325 TABLET ORAL at 06:27

## 2020-12-28 RX ADMIN — PROPOFOL 50 MG: 10 INJECTION, EMULSION INTRAVENOUS at 07:46

## 2020-12-28 RX ADMIN — Medication 80 MCG: at 08:32

## 2020-12-28 RX ADMIN — Medication 120 MCG: at 07:43

## 2020-12-28 RX ADMIN — MORPHINE SULFATE 2 MG: 10 INJECTION INTRAVENOUS at 09:25

## 2020-12-28 RX ADMIN — FENTANYL CITRATE 25 MCG: 50 INJECTION, SOLUTION INTRAMUSCULAR; INTRAVENOUS at 09:00

## 2020-12-28 RX ADMIN — SUCCINYLCHOLINE CHLORIDE 140 MG: 20 INJECTION, SOLUTION INTRAMUSCULAR; INTRAVENOUS at 07:34

## 2020-12-28 RX ADMIN — SODIUM CHLORIDE, POTASSIUM CHLORIDE, SODIUM LACTATE AND CALCIUM CHLORIDE 1000 ML: 600; 310; 30; 20 INJECTION, SOLUTION INTRAVENOUS at 06:35

## 2020-12-28 RX ADMIN — Medication 80 MCG: at 07:42

## 2020-12-28 RX ADMIN — PROPOFOL 150 MG: 10 INJECTION, EMULSION INTRAVENOUS at 07:34

## 2020-12-28 RX ADMIN — MIDAZOLAM 1 MG: 1 INJECTION INTRAMUSCULAR; INTRAVENOUS at 07:30

## 2020-12-28 RX ADMIN — Medication 80 MCG: at 07:34

## 2020-12-28 RX ADMIN — DEXMEDETOMIDINE HYDROCHLORIDE 5 MCG: 100 INJECTION, SOLUTION, CONCENTRATE INTRAVENOUS at 07:28

## 2020-12-28 RX ADMIN — Medication 80 MCG: at 07:54

## 2020-12-28 RX ADMIN — MIDAZOLAM 1 MG: 1 INJECTION INTRAMUSCULAR; INTRAVENOUS at 07:25

## 2020-12-28 RX ADMIN — Medication 120 MCG: at 07:58

## 2020-12-28 RX ADMIN — DEXMEDETOMIDINE HYDROCHLORIDE 5 MCG: 100 INJECTION, SOLUTION, CONCENTRATE INTRAVENOUS at 07:25

## 2020-12-28 RX ADMIN — FENTANYL CITRATE 50 MCG: 50 INJECTION, SOLUTION INTRAMUSCULAR; INTRAVENOUS at 08:53

## 2020-12-28 NOTE — BRIEF OP NOTE
Brief Postoperative Note    Patient: Savana Kimble  YOB: 1960  MRN: [id number]    Date of Procedure: 12/28/2020     Pre-Op Diagnosis: NEOP, MALIGNANT, OROPHARYNX,POSTERIOR    Post-Op Diagnosis: Same as preoperative diagnosis.       Procedure(s):  DIRECT LARYNGOSCOPY WITH BIOPSY    Surgeon(s):  Parrish Braga MD    Surgical Assistant: None    Anesthesia: General     Estimated Blood Loss (mL): Minimal    Complications: None    Specimens:   ID Type Source Tests Collected by Time Destination   1 : Posterior Pharyngeal wall Frozen Section Shawna Lopes MD 12/28/2020 3325 Pathology   2 : Additional Posterior Pharyngeal wall Fresh Pharnyx  Parrish Braga MD 12/28/2020 0830 Pathology        Implants: * No implants in log *    Drains: * No LDAs found *    Findings: Large ulceration posterior pharyngeal wall    Electronically Signed by Farshad Christensen IV, MD on 12/28/2020 at 8:37 AM

## 2020-12-28 NOTE — OP NOTES
[address] [facility]  OPERATIVE REPORT    Name:  Татьяна Cyr  MR#:  [id number]  :  1960  ACCOUNT #:  [de-identified]  DATE OF SERVICE:  2020    PREOPERATIVE DIAGNOSES:  1. Squamous cell carcinoma of the oropharynx, status post chemoradiation therapy. 2.  Dysphagia. POSTOPERATIVE DIAGNOSES:  1. Squamous cell carcinoma of the oropharynx, status post chemoradiation therapy. 2.  Dysphagia. PROCEDURE PERFORMED:  Direct laryngoscopy with biopsy. SURGEON:  Art Schmidt MD    ASSISTANT:  none    ANESTHESIA:  General endotracheal anesthesia. COMPLICATIONS:  None. SPECIMENS REMOVED:  Pharynx biopsies. IMPLANTS:  none. ESTIMATED BLOOD LOSS:  10 mL. INDICATIONS:  This is a 68-year-old gentleman, who previously underwent chemoradiation for a large squamous cell carcinoma of the oropharynx several years prior. He was lost to followup until recently when he presented with worsening problems with swallowing and sore throat. Flexible laryngoscopy in the office revealed irregular mucosal changes involving the majority of the oropharynx worrisome for recurrence. Imaging performed showed irregularity throughout the oropharynx as well. Biopsy in the office was performed which was nondiagnostic. Based on this, he was scheduled for elective direct laryngoscopy with biopsy due to ongoing concern for recurrent disease. Risks of surgery discussed include bleeding, infection, pain, dental injury, airway complication including need for tracheostomy, and need for further surgery. PROCEDURE:  The patient was brought to the operating room and general endotracheal anesthesia was induced. The patient was intubated using a Carrington without difficulty. He was then prepped and draped in the usual fashion. An anterior commissure direct laryngoscope was inserted in the oral cavity and advanced down to visualize the pharynx and larynx.   There was mucosal irregularity with friable tissue and overlying fibrinous-appearing exudate involving the majority of the pharyngeal walls. This involved the posterior and left and right lateral pharyngeal walls. It extended up into the nasopharynx. It extended to involve the right tonsillar fossa, but spared the anterior tonsillar pillar. It extended up to the posterior left tonsillar pillar and extended down inferiorly into the hypopharynx. As it extended inferiorly, the depth of the ulceration appeared deeper inferiorly. The ulceration stopped just above the esophageal introitus. The esophageal introitus and proximal esophageal mucosa appeared normal.  The endolarynx appeared normal with exception of some post radiation edema. The epiglottis was quite swollen from treatment without any overt tumor. No exposed bone was visualized. However, when palpated with the suction, there was one area in the posterior pharyngeal wall that the bone was palpable extremely close to the surface of the ulceration. Several biopsies were taken of the irregular tissue and sent for frozen section. They returned as negative for carcinoma. Multiple additional biopsies were then taken and sent for permanent section. Adrenaline-soaked pledgets were placed for hemostasis. At this time, the procedure was terminated. The patient was awakened, extubated and taken to PACU in stable condition.       Lorene Lefort, MD [id number]  D:  12/28/2020 8:45  T:  12/28/2020 10:10  JOB #:  [id number]

## 2020-12-28 NOTE — DISCHARGE INSTRUCTIONS
[facility]    Head and Neck Post Operative Instructions    1. DIET  Resume previous diet as tolerated. It is important to remember that good overall diet and health promotes healing. 2.  ACTIVITY  No heavy exertion or heavy lifting for 7 days. 3. THINGS TO BE CONCERNED ABOUT  Please call the office for any of these changes  A. Increasing pain  B. Neck swelling  C. Difficulty breathing. If you have any questions or concerns following your surgery, do not hesitate to contact our office at    Office Phone:  [address] office hours are 8:00 a.m. to 4:30 p.m. You should be able to reach us after hours by calling the regular office number. If for some reason you are not able to reach our [address] [place] [address] through this main number you may call them directly at [phone].    ______________________________________________________________________    Anesthesia Discharge Instructions    After general anesthesia or intervenous sedation, for 24 hours or while taking prescription Narcotics:  · Limit your activities  · Do not drive or operate hazardous machinery  · If you have not urinated within 8 hours after discharge, please contact your surgeon on call. · Do not make important personal or business decisions  · Do not drink alcoholic beverages    Report the following to your surgeon:  · Excessive pain, swelling, redness or odor of or around the surgical area  · Temperature over 100.5 degrees  · Nausea and vomiting lasting longer than 4 hours or if unable to take medication  · Any signs of decreased circulation or nerve impairment to extremity:  Change in color, persistent numbness, tingling, coldness or increased pain. · Any questions      Patient Education        Learning About Coronavirus [phone])  Coronavirus [phone]): Overview  What is coronavirus (AVQRC-25)? The coronavirus disease (COVID-19) is caused by a virus.  It is an illness that was first found in December 2019. It has since spread worldwide. The virus can cause fever, cough, and trouble breathing. In severe cases, it can cause pneumonia and make it hard to breathe without help. It can cause death. This virus spreads person-to-person through droplets from coughing and sneezing. It can also spread when you are close to someone who is infected. And it can spread when you touch something that has the virus on it, such as a doorknob or a tabletop. Coronaviruses are a large group of viruses. They cause the common cold. They also cause more serious illnesses like Middle East respiratory syndrome (MERS) and severe acute respiratory syndrome (SARS). COVID-19 is caused by a novel coronavirus. That means it's a new type that has not been seen in people before. How is COVID-19 treated? Mild illness can be treated at home, but more serious illness needs to be treated in the hospital. Treatment may include medicines to reduce symptoms, plus breathing support such as oxygen therapy or a ventilator. Other treatments, such as antiviral medicines, may help people who have COVID-19. What can you do to protect yourself from COVID-19? The best way to protect yourself from getting sick is to:  · Avoid areas where there is an outbreak. · Avoid contact with people who may be infected. · Avoid crowds and try to stay at least 6 feet away from other people. · Wash your hands often, especially after you cough or sneeze. Use soap and water, and scrub for at least 20 seconds. If soap and water aren't available, use an alcohol-based hand . · Avoid touching your mouth, nose, and eyes. What can you do to avoid spreading the virus to others? To help avoid spreading the virus to others:  · Wash your hands often with soap or alcohol-based hand sanitizers. · Cover your mouth with a tissue when you cough or sneeze. Then throw the tissue in the trash.   · Use a disinfectant to clean things that you touch often. These include doorknobs, remote controls, phones, and handles on your refrigerator and microwave. And don't forget countertops, tabletops, bathrooms, and computer keyboards. · Wear a cloth face cover if you have to go to public areas. If you know or suspect that you have COVID-19:  · Stay home. Don't go to school, work, or public areas. And don't use public transportation, ride-shares, or taxis unless you have no choice. · Leave your home only if you need to get medical care or testing. But call the doctor's office first so they know you're coming. And wear a face cover. · Limit contact with people in your home. If possible, stay in a separate bedroom and use a separate bathroom. · Wear a face cover whenever you're around other people. It can help stop the spread of the virus when you cough or sneeze. · Clean and disinfect your home every day. Use household  and disinfectant wipes or sprays. Take special care to clean things that you grab with your hands. · Self-isolate until it's safe to be around others again. ? If you have symptoms, it's safe when you haven't had a fever for 3 days and your symptoms have improved and it's been at least 10 days since your symptoms started. ? If you were exposed to the virus but don't have symptoms, it's safe to be around others 14 days after exposure. ? Talk to your doctor about whether you also need testing, especially if you have a weakened immune system. When to call for help  Call 911 anytime you think you may need emergency care. For example, call if:  · You have severe trouble breathing. (You can't talk at all.)  · You have constant chest pain or pressure. · You are severely dizzy or lightheaded. · You are confused or can't think clearly. · Your face and lips have a blue color. · You passed out (lost consciousness) or are very hard to wake up.   Call your doctor now if you develop symptoms such as:  · Shortness of breath. · Fever. · Cough. If you need to get care, call ahead to the doctor's office for instructions before you go. Make sure you wear a face cover to prevent exposing other people to the virus. Where can you get the latest information? The following health organizations are tracking and studying this virus. Their websites contain the most up-to-date information. Fabricio Apo also learn what to do if you think you may have been exposed to the virus. · U.S. Centers for Disease Control and Prevention (CDC): The CDC provides updated news about the disease and travel advice. The website also tells you how to prevent the spread of infection. www.cdc.gov  · World Health Organization [facility]): WHO offers information about the virus outbreaks. WHO also has travel advice. www.who.int  Current as of: July 10, 2020               Content Version: 12.6  © 6055-8467 [employer]. Care instructions adapted under license by [employer] (which disclaims liability or warranty for this information). If you have questions about a medical condition or this instruction, always ask your healthcare professional. [address] any warranty or liability for your use of this information.

## 2020-12-28 NOTE — PERIOP NOTES
12/28/2020      RE: Raman Bartlett      To Whom it May Concern: This is to certify that Raman Bartlett had surgery today and may return to work after follow up with Dr Christensen. Please feel free to contact my office if you have any questions or concerns. Thank you for your assistance in this matter.     Sincerely,      Indy Marcum RN

## 2020-12-28 NOTE — ANESTHESIA POSTPROCEDURE EVALUATION
Post-Anesthesia Evaluation and Assessment    Patient: Savana Kimble MRN: [id number]  SSN: [id number]    YOB: 1960  Age: 56 y.o. Sex: male      I have evaluated the patient and they are stable and ready for discharge from the PACU. Cardiovascular Function/Vital Signs  Visit Vitals  /64 (BP 1 Location: Right arm, BP Patient Position: At rest)   Pulse 83   Temp 36.4 °C (97.6 °F)   Resp 16   Ht 6' 3\" (1.905 m)   Wt 70.8 kg (156 lb 1.4 oz)   SpO2 92%   BMI 19.51 kg/m²       Patient is status post General anesthesia for Procedure(s):  DIRECT LARYNGOSCOPY WITH BIOPSY. Nausea/Vomiting: None    Postoperative hydration reviewed and adequate. Pain:  Pain Scale 1: Numeric (0 - 10) (12/28/20 0900)  Pain Intensity 1: 3 (12/28/20 0612)   Managed    Neurological Status:   Neuro (WDL): Within Defined Limits (12/28/20 0900)  Neuro  Neurologic State: Appropriate for age;Eyes open spontaneously; Eyes open to voice (12/28/20 0900)  LUE Motor Response: Purposeful (12/28/20 0900)  LLE Motor Response: Purposeful (12/28/20 0900)  RUE Motor Response: Purposeful (12/28/20 0900)  RLE Motor Response: Purposeful (12/28/20 0900)   At baseline    Mental Status, Level of Consciousness: Alert and  oriented to person, place, and time    Pulmonary Status:   O2 Device: CO2 nasal cannula (12/28/20 0900)   Adequate oxygenation and airway patent    Complications related to anesthesia: None    Post-anesthesia assessment completed. No concerns    Signed By: Zoë Macdonald MD     December 28, 2020              Procedure(s):  DIRECT LARYNGOSCOPY WITH BIOPSY. general    <BSHSIANPOST>    INITIAL Post-op Vital signs:   Vitals Value Taken Time   /64 12/28/20 0900   Temp 36.4 °C (97.6 °F) 12/28/20 0900   Pulse 87 12/28/20 0917   Resp 15 12/28/20 0917   SpO2 98 % 12/28/20 0917   Vitals shown include unvalidated device data.

## 2020-12-28 NOTE — PERIOP NOTES
Discharge instructions given to patient and parents. Opportunity for questions. Verbalized understanding. Paper copy given for home.

## 2020-12-28 NOTE — ROUTINE PROCESS
Patient: Jen Avitia MRN: [id number]  SSN: [id number] YOB: 1960  Age: 56 y.o. Sex: male Patient is status post Procedure(s): DIRECT LARYNGOSCOPY WITH BIOPSY. Surgeon(s) and Role: * Yuliya Kauffman MD - Primary Local/Dose/Irrigation:  0.9% normal saline used for irrigation Venous Access Device Powerport 10/21/15 Upper chest (subclavicular area, right (Active) Peripheral IV 12/28/20 Left Arm (Active) Site Assessment Clean, dry, & intact 12/28/20 2390 Phlebitis Assessment 0 12/28/20 0925 Infiltration Assessment 0 12/28/20 2073 Dressing Status Clean, dry, & intact; New 12/28/20 0565 Dressing Type Tape;Transparent 12/28/20 3848 Hub Color/Line Status Green 12/28/20 1425 Airway - Endotracheal Tube 12/28/20 Oral (Active) Dressing/Packing:      
Splint/Cast:  ] Other:  SCDs

## 2021-05-17 NOTE — PROGRESS NOTES
Joce Kemp is a 59 y.o. male here for 6 month follow up for oropharyngeal cancer. 1. Have you been to the ER, urgent care clinic since your last visit? Hospitalized since your last visit? no    2. Have you seen or consulted any other health care providers outside of the [address] [place] [address] since your last visit? Include any pap smears or colon screening. no    Pt lost his voice about 3 weeks ago. He has been on antibiotic for 2 weeks. Was told it was a sinus infection. Voice comes and goes now. Appetite is good. He has gained about 12 lbs since December. He had oxygen therapy about one month ago which messed up his ears. He developed an ear infection.

## 2021-05-19 ENCOUNTER — OFFICE VISIT (OUTPATIENT)
Dept: ONCOLOGY | Age: 61
End: 2021-05-19
Payer: COMMERCIAL

## 2021-05-19 VITALS
HEART RATE: 83 BPM | OXYGEN SATURATION: 95 % | BODY MASS INDEX: 21.01 KG/M2 | WEIGHT: 169 LBS | DIASTOLIC BLOOD PRESSURE: 86 MMHG | SYSTOLIC BLOOD PRESSURE: 169 MMHG | HEIGHT: 75 IN | TEMPERATURE: 98.4 F

## 2021-05-19 DIAGNOSIS — C76.0 HEAD AND NECK CANCER (HCC): ICD-10-CM

## 2021-05-19 DIAGNOSIS — Z85.89 HISTORY OF HEAD AND NECK CANCER: Primary | ICD-10-CM

## 2021-05-19 PROCEDURE — 99213 OFFICE O/P EST LOW 20 MIN: CPT | Performed by: INTERNAL MEDICINE

## 2021-05-19 RX ORDER — OFLOXACIN 3 MG/ML
5 SOLUTION AURICULAR (OTIC) DAILY
COMMUNITY

## 2021-05-19 RX ORDER — AMOXICILLIN 875 MG/1
875 TABLET, FILM COATED ORAL 2 TIMES DAILY
COMMUNITY

## 2021-05-19 RX ORDER — NYSTATIN 100000 [USP'U]/ML
SUSPENSION ORAL 4 TIMES DAILY
COMMUNITY

## 2021-05-19 NOTE — PROGRESS NOTES
[address] [facility] [address] \[facility] [place] [facility]\"", [address] [place] [address]  [phone]     Follow-up Note        Patient: Zaheer Eric MRN: [id number]  SSN: [id number]    YOB: 1960  Age: 59 y.o. Sex: male      Diagnosis:      1. Squamous cell carcinoma of the oropharynx, Dx 2015   T4b N2a M0 (Stage IVB)    Treatment:      Previous therapies    1. Concurrent chemo/rads   single agent Cisplatin, Completed 2 cycles, stopped 1/13 due to renal insufficiency   Radiation completed on 2/5/2016  2. Induction chemotherapy - s/p TPF completed 2 cycles    Subjective:      Antwon Ring is a 60 y.o. male with a history of heavy cigarette smoking and alcohol use who was diagnosed with locally advanced oropharyngeal carcinoma in 2015. He received 2 cycles of induction chemotherapy and tolerated it well. He had marked improvement in symptoms. He then received concurrent chemo/ Rads. He achieved a complete response. Mr. Nolberto Adnino developed necrosis of the pharyngeal area. He underwent hyperbaric treatment with minimal benefit. He is gaining weight and swallowing better. He has developed a sore throat. Review of Systems:    Constitutional: fatigue, weakness  Eyes: negative  Ears, Nose, Mouth, Throat, and Face: dysphagia, hoarseness of voice  Respiratory: negative  Cardiovascular: negative  Gastrointestinal: dysphagia  Integument/Breast: negative  Hematologic/Lymphatic: negative  Musculoskeletal:negative  Neurological: negative    ROS was pulled in from a previous visit. No changes were made d/t symptoms remain the same.       Past Medical History:   Diagnosis Date    Anxiety     C. difficile colitis 2009    Cancer [facility]) 2015    HEAD/NECK    Sore throat      Past Surgical History:   Procedure Laterality Date    HX OTHER SURGICAL  8-21-15    Biopsy throat    HX TONSILLECTOMY      AS A CHILD    PLACE PERCUT GASTROSTOMY TUBE 9/30/2015           Family History   Problem Relation Age of Onset    Cancer Mother         breast    Cancer Father         prostate    Other Sister         hysterectomy    Anesth Problems Neg Hx      Social History     Tobacco Use    Smoking status: Current Every Day Smoker     Packs/day: 1.00     Years: 35.00     Pack years: 35.00    Smokeless tobacco: Never Used   Substance Use Topics    Alcohol use: Not Currently     Alcohol/week: 0.0 standard drinks     Comment: RECOVERED ALCOHOLIC          No Known Allergies        Objective:     Vitals:    05/19/21 0942   BP: (!) 169/86   Pulse: 83   Temp: 98.4 °F (36.9 °C)   TempSrc: Temporal   SpO2: 95%   Weight: 169 lb (76.7 kg)   Height: 6' 3\" (1.905 m)      Pain Scale: 0 - No pain/10      Physical Exam:    GENERAL: alert, cooperative, weak, cachectic  EYE: negative, no pallor or icterus  LYMPHATIC: Cervical, supraclavicular, and axillary nodes normal.   THROAT & NECK: hard appearing larynx  LUNG: clear to auscultation bilaterally  HEART: regular rate and rhythm  ABDOMEN: soft, non-tender on palpation  EXTREMITIES: no edema  SKIN: Normal. No rash or ecchymosis  NEUROLOGIC: negative    Physical exam was pulled in from a previous visit. No changes were made d/t physical exam remains the same. CT Results (most recent):  Results from [place] encounter on 11/28/20    CT NECK SOFT TISSUE W CONT    Narrative  EXAM:  CT NECK SOFT TISSUE W CONT    CLINICAL INFORMATION: restaging of disease  COMPARISON:  CT of 2/9/2018    TECHNIQUE: Axial neck CT was performed  following the IV injection of 100 cc  Isovue-370. Coronal  and sagittal and sagittal reformatted images were  generated. CT dose reduction was achieved through the use of a standardized  protocol tailored for this examination and automatic exposure control for dose  modulation. FINDINGS:    VISUALIZED ORBITS, PARANASAL SINUSES, AND SKULL BASE: No significant  abnormalities.   NASOPHARYNX:  No focal mass. Retained secretions in the dependent portion of the  nasopharynx. SUPRAHYOID NECK:  No discrete mass. Retained secretions noted in the dependent  portion. Diffuse edema of the epiglottis and hypopharynx, most consistent with  post therapeutic changes. Increased since the previous study. Small  retropharyngeal effusion. INFRAHYOID NECK:  Larynx intact. THYROID:  Normal.  SALIVARY GLANDS: Atrophic bilateral submandibular glands, consistent with post  therapeutic change. THORACIC INLET: CT chest reported separately. LYMPH NODES: No lymph node enlargement or heterogeneity. VASCULAR STRUCTURES:  Patent. Calcified plaque in the carotid bifurcations  bilaterally with at least moderate proximal internal carotid artery stenosis,  left greater than right. BONES: No significant abnormalities. OTHER FINDINGS:  None. Impression  IMPRESSION:  1. Posttreatment changes in the neck. No adenopathy or local disease recurrence. 2. Increase in edema involving the pharyngeal walls and epiglottis when compared  to 2/9/2018. Increase in retained secretions in the dependent portion of the  pharynx. 3. Calcified plaque in the carotid bifurcations with at least moderate bilateral  stenosis, left greater than right. Assessment:      1. 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 H/O Pulmonary emobli    > Asymptomatic - incidental finding on CT  > Xarelto started 3/3/16 - completed 6 months  > Currently on Aspirin 81 mg daily      3. Severe protein calorie malnutrition    > Dietician consult  > Protein supplementation      Plan:      1.  Return as needed    I performed a history and physical examination of the patient and discussed his management with the NPP. I reviewed the NPP note and agree with the documented findings and plan of care. The patient was seen in conjunction with Ms. Bhagat. Mr. Jack Marsh is a gentleman with history of head and neck ca. He remains in remission. His physical exam is remarkable for thin stature and hoarse voice. Signed by: Anila Hodgson MD                     May 19, 2021      CC.  Greg Tinoco MD

## 2021-06-27 ENCOUNTER — HOSPITAL ENCOUNTER (EMERGENCY)
Age: 61
Discharge: STILL ADMITTED OR ADMINISTRATIVE | End: 2021-06-27

## 2023-05-11 RX ORDER — AMOXICILLIN 875 MG/1
TABLET, COATED ORAL 2 TIMES DAILY
COMMUNITY

## 2023-05-11 RX ORDER — OFLOXACIN 3 MG/ML
5 SOLUTION AURICULAR (OTIC) DAILY
COMMUNITY

## 2023-05-11 RX ORDER — ACETAMINOPHEN 500 MG
TABLET ORAL EVERY 6 HOURS PRN
COMMUNITY

## (undated) DEVICE — DRAPE,REIN 53X77,STERILE: Brand: MEDLINE

## (undated) DEVICE — TOWEL SURG W17XL27IN STD BLU COT NONFENESTRATED PREWASHED

## (undated) DEVICE — CODMAN® SURGICAL PATTIES 1/2" X 1/2" (1.27CM X 1.27CM): Brand: CODMAN®

## (undated) DEVICE — STRAP,POSITIONING,KNEE/BODY,FOAM,4X60": Brand: MEDLINE

## (undated) DEVICE — SPONGE GZ W4XL4IN COT RADPQ HIGHLY ABSRB

## (undated) DEVICE — CONTAINER,SPECIMEN,4OZ,OR STRL: Brand: MEDLINE

## (undated) DEVICE — UNDERGLOVE SURG SZ 7.5 BLU LTX FREE SYN POLYISOPRENE

## (undated) DEVICE — INFECTION CONTROL KIT SYS

## (undated) DEVICE — TUBING, SUCTION, 1/4" X 12', STRAIGHT: Brand: MEDLINE

## (undated) DEVICE — GUARD TEETH AD NYL FOR LARYNSCP POS PROTCT

## (undated) DEVICE — SOLUTION IV 1000ML 0.9% SOD CHL

## (undated) DEVICE — 40418 TRENDELENBURG ONE-STEP ARM PROTECTORS LARGE (1 PAIR): Brand: 40418 TRENDELENBURG ONE-STEP ARM PROTECTORS LARGE (1 PAIR)

## (undated) DEVICE — GARMENT,MEDLINE,DVT,INT,CALF,MED, GEN2: Brand: MEDLINE

## (undated) DEVICE — PAD,NON-ADHERENT,3X8,STERILE,LF,1/PK: Brand: MEDLINE

## (undated) DEVICE — NDL PRT INJ NSAF BLNT 18GX1.5 --